# Patient Record
Sex: FEMALE | Race: WHITE | NOT HISPANIC OR LATINO | Employment: FULL TIME | URBAN - METROPOLITAN AREA
[De-identification: names, ages, dates, MRNs, and addresses within clinical notes are randomized per-mention and may not be internally consistent; named-entity substitution may affect disease eponyms.]

---

## 2024-02-23 ENCOUNTER — HOSPITAL ENCOUNTER (EMERGENCY)
Facility: CLINIC | Age: 24
Discharge: STILL ADMITTED OR ADMINISTRATIVE | End: 2024-02-23
Attending: EMERGENCY MEDICINE

## 2024-02-23 VITALS
DIASTOLIC BLOOD PRESSURE: 101 MMHG | HEART RATE: 131 BPM | BODY MASS INDEX: 25.11 KG/M2 | SYSTOLIC BLOOD PRESSURE: 138 MMHG | OXYGEN SATURATION: 99 % | RESPIRATION RATE: 22 BRPM | HEIGHT: 67 IN | WEIGHT: 160 LBS | TEMPERATURE: 97.8 F

## 2024-02-23 RX ORDER — LIDOCAINE HYDROCHLORIDE AND EPINEPHRINE 10; 10 MG/ML; UG/ML
1 INJECTION, SOLUTION INFILTRATION; PERINEURAL ONCE
Status: DISCONTINUED | OUTPATIENT
Start: 2024-02-23 | End: 2024-02-23

## 2024-02-23 ASSESSMENT — COLUMBIA-SUICIDE SEVERITY RATING SCALE - C-SSRS
6. HAVE YOU EVER DONE ANYTHING, STARTED TO DO ANYTHING, OR PREPARED TO DO ANYTHING TO END YOUR LIFE?: NO
2. HAVE YOU ACTUALLY HAD ANY THOUGHTS OF KILLING YOURSELF IN THE PAST MONTH?: NO
1. IN THE PAST MONTH, HAVE YOU WISHED YOU WERE DEAD OR WISHED YOU COULD GO TO SLEEP AND NOT WAKE UP?: NO

## 2024-02-23 ASSESSMENT — ACTIVITIES OF DAILY LIVING (ADL)
ADLS_ACUITY_SCORE: 35

## 2024-02-23 NOTE — ED PROVIDER NOTES
"ED Provider Note  [facility]      History     Chief Complaint   Patient presents with    Laceration     HPI  Umu Ellis is a 23 year old female who presents after a fall.  She was out night with friends and was walking backward on the street when she lost her balance and struck her head on a pole.  She has laceration to back of her head.  She does not think she lost consciousness. she has no pain in the neck, back or extremities. she is not anticoagulated or immunosuppressed.     Past Medical History  No past medical history on file.  No past surgical history on file.  BENADRYL ALLERGY OR  cetirizine (ZYRTEC) 10 MG tablet  CLARITIN OR      Allergies   Allergen Reactions    Seasonal Allergies      Family History  Family History   Problem Relation Age of Onset    Hypertension Father     Hypertension Maternal Grandmother     Lipids Maternal Grandmother     Cardiovascular Maternal Grandmother         heart murmur     Social History   Social History     Tobacco Use    Smoking status: Never         A medically appropriate review of systems was performed with pertinent positives and negatives noted in the HPI, and all other systems negative.    Physical Exam   BP: (!) 138/101  Pulse: (!) 131  Temp: 97.8  F (36.6  C)  Resp: 22  Height: 170.2 cm (5' 7\")  Weight: 72.6 kg (160 lb)  SpO2: 99 %    Physical Exam  Gen:A&Ox3, tearful  HEENT:PERRL, 3cm laceration to posterior scalp. No skull base fracture signs.   Neck:no bony tenderness or step offs and full ROM without pain  Back: no CVA tenderness, no midline bony tenderness  CV:RRR without murmurs  PULM:Clear to auscultation bilaterally  Abd:soft, nontender, nondistended. Bowel sounds present and normal  UE:No traumatic injuries, skin normal. + radial pulse and normal perfusion  LE:no traumatic injuries, skin normal  Neuro:CN II-XII intact, strength 5/5 of flexion and extension of the toes, ankles, knees, hips, hands, wrists, elbows and " shoulders.  Sensation intact to touch throughout. Coordination normal on finger to nose testing.  Skin: no rashes or ecchymoses      ED Course, Procedures, & Data      [facility]    -Laceration Repair    Date/Time: 2/23/2024 1:05 AM    Performed by: Raine Little MD  Authorized by: Raine Little MD    Risks, benefits and alternatives discussed.      ANESTHESIA (see MAR for exact dosages):     Anesthesia method:  Local infiltration    Local anesthetic:  Lidocaine 1% WITH epi  LACERATION DETAILS     Location:  Scalp    Scalp location:  Occipital    Length (cm):  3    REPAIR TYPE:     Repair type:  Intermediate    EXPLORATION:     Hemostasis achieved with:  Epinephrine    Wound exploration: wound explored through full range of motion and entire depth of wound probed and visualized      Wound extent: fascia not violated, no foreign body, no nerve damage and no underlying fracture      Contaminated: no      TREATMENT:     Area cleansed with:  Saline    Irrigation solution:  Sterile saline    Irrigation method:  Pressure wash    SKIN REPAIR     Repair method:  Staples    Number of staples:  10    APPROXIMATION     Approximation:  Close    POST-PROCEDURE DETAILS     Dressing:  Antibiotic ointment and bulky dressing      PROCEDURE    Patient Tolerance:  Patient tolerated the procedure well with no immediate complications      No results found for any visits on 02/23/24.  Medications   lidocaine 1% with EPINEPHrine 1:100,000 injection 1 mL (has no administration in time range)   Tdap (tetanus-diphtheria-acell pertussis) (ADACEL) injection 0.5 mL (has no administration in time range)     Labs Ordered and Resulted from Time of ED Arrival to Time of ED Departure - No data to display  No orders to display       Critical care was not performed.     Medical Decision Making  The patient's presentation was of moderate complexity (an acute complicated injury).    The  patient's evaluation involved:  strong consideration of a test (see separate area of note for details) that was ultimately deferred    The patient's management necessitated moderate risk (a decision regarding minor procedure (laceration repair) with risk factors of none).    Assessment & Plan    24 yo F presenting after a fall with scalp laceration.   Arrives with tachycardia, this improved as she calmed down.   Her neurologic exam is without deficits.   She is slightly intoxicated but appears to mentating normally. She was monitored in the ED and continued to maintain normal mental status without signs of intracranial injury. CT head deferred due to negative NEXUS II criteria.    Wound care as noted above in the procedure note.   Patient discharged in the care of a friend with plan for follow-up with [facility] in 7 days for staple removal.  Her last tetanus was done in August 2014 so this was updated tonight with Tdap.  Home concussion information reviewed. Follow up with [facility] for symptom reassessment.     I have reviewed the nursing notes. I have reviewed the findings, diagnosis, plan and need for follow up with the patient.    New Prescriptions    No medications on file       Final diagnoses:   Scalp laceration, initial encounter     This part of the medical record was transcribed by Ashley Reeves Medical Scribe, from a dictation done by Raine Little MD.     Raine Little MD  [facility] EMERGENCY DEPARTMENT  2/23/2024     Raine Little MD  02/23/24 [id number]